# Patient Record
Sex: FEMALE | Race: WHITE | NOT HISPANIC OR LATINO | Employment: OTHER | URBAN - METROPOLITAN AREA
[De-identification: names, ages, dates, MRNs, and addresses within clinical notes are randomized per-mention and may not be internally consistent; named-entity substitution may affect disease eponyms.]

---

## 2023-10-26 PROBLEM — I10 UNCONTROLLED HYPERTENSION: Status: ACTIVE | Noted: 2023-10-26

## 2023-10-30 PROBLEM — E04.2 NONTOXIC MULTINODULAR GOITER: Chronic | Status: ACTIVE | Noted: 2023-10-30

## 2023-10-30 PROBLEM — D18.09 GLOMUS TYMPANICUM TUMOR: Status: ACTIVE | Noted: 2023-10-30

## 2023-10-30 PROBLEM — K59.00 CONSTIPATION: Chronic | Status: ACTIVE | Noted: 2023-10-30

## 2023-10-30 PROBLEM — Z72.0 TOBACCO USE: Chronic | Status: ACTIVE | Noted: 2023-10-30

## 2023-11-14 PROBLEM — Z87.891 PERSONAL HISTORY OF TOBACCO USE, PRESENTING HAZARDS TO HEALTH: Chronic | Status: ACTIVE | Noted: 2023-11-14

## 2023-11-16 PROBLEM — E04.1 THYROID NODULE: Status: ACTIVE | Noted: 2023-10-30

## 2023-11-16 PROBLEM — I71.010 ASCENDING AORTIC DISSECTION: Status: ACTIVE | Noted: 2023-11-16

## 2023-11-16 PROBLEM — I71.21 ANEURYSM OF ASCENDING AORTA WITHOUT RUPTURE: Status: ACTIVE | Noted: 2023-11-16

## 2024-03-13 PROBLEM — I25.10 CAD (CORONARY ARTERY DISEASE): Status: ACTIVE | Noted: 2024-03-13

## 2024-03-22 PROBLEM — J44.1 COPD EXACERBATION: Status: ACTIVE | Noted: 2024-03-22

## 2024-03-22 PROBLEM — J43.2 CENTRILOBULAR EMPHYSEMA: Status: ACTIVE | Noted: 2024-03-22

## 2024-03-23 PROBLEM — J96.01 ACUTE HYPOXIC RESPIRATORY FAILURE: Status: ACTIVE | Noted: 2024-03-23

## 2024-04-02 PROBLEM — R78.81 BACTEREMIA DUE TO STAPHYLOCOCCUS AUREUS: Status: ACTIVE | Noted: 2024-04-02

## 2024-04-02 PROBLEM — J15.211 PNEUMONIA DUE TO METHICILLIN SUSCEPTIBLE STAPHYLOCOCCUS AUREUS (MSSA): Status: ACTIVE | Noted: 2024-04-02

## 2024-04-02 PROBLEM — B95.61 BACTEREMIA DUE TO STAPHYLOCOCCUS AUREUS: Status: ACTIVE | Noted: 2024-04-02

## 2024-04-03 PROBLEM — J15.212 PNEUMONIA DUE TO METHICILLIN RESISTANT STAPHYLOCOCCUS AUREUS (MRSA): Status: ACTIVE | Noted: 2024-04-03

## 2024-04-04 PROBLEM — J15.211 PNEUMONIA DUE TO METHICILLIN SUSCEPTIBLE STAPHYLOCOCCUS AUREUS (MSSA): Status: RESOLVED | Noted: 2024-04-02 | Resolved: 2024-04-04

## 2024-04-08 PROBLEM — L08.9 STERNAL WOUND INFECTION: Status: ACTIVE | Noted: 2024-04-08

## 2024-04-08 PROBLEM — S21.101A STERNAL WOUND INFECTION: Status: ACTIVE | Noted: 2024-04-08

## 2024-04-10 PROBLEM — J38.00 VOCAL CORD PARALYSIS: Status: ACTIVE | Noted: 2024-04-10

## 2024-04-12 PROBLEM — J44.1 COPD EXACERBATION: Status: RESOLVED | Noted: 2024-03-22 | Resolved: 2024-04-12

## 2024-04-13 PROBLEM — I25.10 CAD (CORONARY ARTERY DISEASE): Chronic | Status: ACTIVE | Noted: 2024-03-13

## 2024-04-15 PROBLEM — E78.5 HYPERLIPIDEMIA: Status: ACTIVE | Noted: 2024-04-15

## 2024-04-15 PROBLEM — Z98.890 S/P ASCENDING AORTIC ANEURYSM REPAIR: Status: ACTIVE | Noted: 2024-04-15

## 2024-04-15 PROBLEM — Z86.79 S/P ASCENDING AORTIC ANEURYSM REPAIR: Status: ACTIVE | Noted: 2024-04-15

## 2024-04-15 PROBLEM — I48.91 ATRIAL FIBRILLATION WITH RVR: Status: ACTIVE | Noted: 2024-04-15

## 2024-04-16 ENCOUNTER — PATIENT OUTREACH (OUTPATIENT)
Dept: ADMINISTRATIVE | Facility: HOSPITAL | Age: 69
End: 2024-04-16

## 2024-04-16 PROBLEM — Z93.0 TRACHEOSTOMY DEPENDENCE: Status: ACTIVE | Noted: 2024-04-16

## 2024-04-17 PROBLEM — Z93.1 S/P PERCUTANEOUS ENDOSCOPIC GASTROSTOMY (PEG) TUBE PLACEMENT: Status: ACTIVE | Noted: 2024-04-17

## 2024-05-08 ENCOUNTER — TELEPHONE (OUTPATIENT)
Dept: ADMINISTRATIVE | Facility: HOSPITAL | Age: 69
End: 2024-05-08

## 2024-05-08 VITALS — SYSTOLIC BLOOD PRESSURE: 91 MMHG | DIASTOLIC BLOOD PRESSURE: 53 MMHG

## 2024-06-24 PROBLEM — J96.01 ACUTE HYPOXIC RESPIRATORY FAILURE: Status: RESOLVED | Noted: 2024-03-23 | Resolved: 2024-06-24

## 2024-07-08 PROBLEM — J15.212 PNEUMONIA DUE TO METHICILLIN RESISTANT STAPHYLOCOCCUS AUREUS (MRSA): Status: RESOLVED | Noted: 2024-04-03 | Resolved: 2024-07-08

## 2024-10-14 ENCOUNTER — SOCIAL WORK (OUTPATIENT)
Dept: ADMINISTRATIVE | Facility: OTHER | Age: 69
End: 2024-10-14

## 2024-10-14 PROBLEM — I48.91 ATRIAL FIBRILLATION, UNSPECIFIED TYPE: Status: ACTIVE | Noted: 2024-10-14

## 2024-10-14 NOTE — PROGRESS NOTES
Received a message in regards to pt obtaining medication assistance. A called has been made to pt to verify what medication is needed. Pt is requesting asstiances with the medication Eliquis. After talking with pt, Pt has started a new enrollment on Eliquis through PAP. A doctor signature and script is needed. In basket doctor regarding this matter. One of the requirements for PAP. Pt must provide financial documentation. Pt has agreed to provide the requested documents for enrollment on 10/15/2024. Once the requested documents are received, the PAP application will be submitted for review.     Hoda Dimasons    [phone]   [phone] Fax

## 2024-10-18 ENCOUNTER — SOCIAL WORK (OUTPATIENT)
Dept: ADMINISTRATIVE | Facility: OTHER | Age: 69
End: 2024-10-18

## 2024-10-18 NOTE — PROGRESS NOTES
The requested documents have been obtain and the PAP application on Eliquis has been submitted for review. A decision will be made in 5 to 7 days. Pt has been updated on enrollment.     Hoda Sharp    [phone]   [phone] Fax

## 2024-10-24 ENCOUNTER — SOCIAL WORK (OUTPATIENT)
Dept: ADMINISTRATIVE | Facility: OTHER | Age: 69
End: 2024-10-24

## 2024-10-24 NOTE — PROGRESS NOTES
A fax has been received from [employer]. According to the fax determination, pt is not eligible to receive Eliquis. The reason given was pt has not provided proof of denial for the Medicare Part D Low Income Subsidy (LIS) program also called as Extra Help. Pt will need to provide their denial letter from LIS/ Extra Help program. The pt will also need to provide documentation of out of pocket prescription expenses for her household for the year, that equals at least 3%  of her  household adjusted gross income. Pt has been notified and informed of the outcome. [phone]. Pt stated she will contact the Social Security Office.       Hoda Sharp    [phone]  [phone] Fax

## 2025-04-17 ENCOUNTER — PATIENT OUTREACH (OUTPATIENT)
Dept: ADMINISTRATIVE | Facility: HOSPITAL | Age: 70
End: 2025-04-17

## 2025-04-17 DIAGNOSIS — Z12.11 SCREENING FOR COLORECTAL CANCER: Primary | ICD-10-CM

## 2025-04-17 DIAGNOSIS — Z12.12 SCREENING FOR COLORECTAL CANCER: Primary | ICD-10-CM
